# Patient Record
Sex: FEMALE | Race: WHITE | Employment: FULL TIME | URBAN - METROPOLITAN AREA
[De-identification: names, ages, dates, MRNs, and addresses within clinical notes are randomized per-mention and may not be internally consistent; named-entity substitution may affect disease eponyms.]

---

## 2021-10-05 ENCOUNTER — HOSPITAL ENCOUNTER (OUTPATIENT)
Dept: GENERAL RADIOLOGY | Age: 46
Discharge: HOME | End: 2021-10-05
Payer: COMMERCIAL

## 2021-10-05 ENCOUNTER — HOSPITAL ENCOUNTER (OUTPATIENT)
Age: 46
Discharge: HOME | End: 2021-10-05
Payer: COMMERCIAL

## 2021-10-05 ENCOUNTER — HOSPITAL ENCOUNTER (OUTPATIENT)
Dept: NEUROLOGY | Age: 46
Discharge: HOME | End: 2021-10-05
Payer: COMMERCIAL

## 2021-10-05 DIAGNOSIS — M54.12 CERVICAL RADICULOPATHY: ICD-10-CM

## 2021-10-05 DIAGNOSIS — M54.12 BRACHIAL NEURITIS: ICD-10-CM

## 2021-10-05 PROCEDURE — 95886 MUSC TEST DONE W/N TEST COMP: CPT

## 2021-10-05 PROCEDURE — 95908 NRV CNDJ TST 3-4 STUDIES: CPT

## 2021-10-05 PROCEDURE — 72040 X-RAY EXAM NECK SPINE 2-3 VW: CPT

## 2021-10-05 NOTE — PROCEDURES
Test Date:  10/5/2021    Patient: Christina Arguello : 1975 Physician: Tao Lakhani DO   Sex: Female ID#:  Ref Phys: Pierce العراقي  APRN-CNP     Patient Complaints:  Patient is a 51year-old female who presents with neck pain radiating to the Left upper extremity onset months ago with numbness in fingers and arms. Patient History / Exam:  PMH: no endocrine disease. + hep B no neck or arm surgery PE: reflexes trace,  mild thumb opposition weakness     NCV & EMG Findings:  Evaluation of the Left ulnar sensory nerve showed decreased conduction velocity (38 m/s). All remaining nerves (as indicated in the following tables) were within normal limits. All examined muscles (as indicated in the following table) showed no evidence of electrical instability.       Impression:  Essentially normal study without evidence of an acute radiculopathy, entrapment neuropathy or other lower motor neuron dysfunction Thank you     Tao Lakhani DO        Nerve Conduction Studies  Motor Nerve Results      Latency Amplitude F-Lat Segment Distance CV Comment   Site (ms) Norm (mV) Norm (ms)  (cm) (m/s) Norm    Left Median (APB) Motor   Wrist 3.6  < 4.2 7.0  > 5.0         Elbow 6.4 - 7.0 -  Elbow-Wrist 18 64  > 50    Left Ulnar (ADM) Motor   Wrist 3.0  < 4.2 7.5  > 3.0         Bel Elbow 5.8 - 6.5 -  Bel Elbow-Wrist 18 64  > 50    Abv Elbow 6.9 - 6.9 -  Abv Elbow-Bel Elbow 6 55  > 48      Sensory Nerve Results      Latency (Peak) Amplitude (P-P) Segment Distance CV Comment   Site (ms) Norm (µV) Norm  (cm) (m/s) Norm    Left Median Sensory   Wrist-Dig II 3.5  < 3.6 71  > 10 Wrist-Dig II 14 40  > 39    Left Ulnar Sensory   Wrist-Dig V 3.7  < 3.7 69  > 15 Wrist-Dig V 14 38  > 38        Electromyography     Side Muscle Nerve Root Ins Act Fibs Psw Amp Dur Poly Recrt Int Lovette Lennert Comment   Left Deltoid Axillary C5-C6 Nml Nml Nml Nml Nml 0 Nml Nml    Left Biceps Musculocut C5-C6 Nml Nml Nml Nml Nml 0 Nml Nml    Left Triceps Radial C6-C8 Nml Nml Nml Nml Nml 0 Nml Nml    Left Brachiorad Radial C5-C6 Nml Nml Nml Nml Nml 0 Nml Nml    Left Pronator Teres Median C6-C7 Nml Nml Nml Nml Nml 0 Nml Nml    Left EIP Post Interosseous,  R... C7-C8 Nml Nml Nml Nml Nml 0 Nml Nml    Left APB Median C8-T1 Nml Nml Nml Nml Nml 0 Nml Nml    Left FDI Ulnar C8-T1 Nml Nml Nml Nml Nml 0 Nml Nml    Left Cervical Paraspinal (Uppe. .. Rami C1-C3 Nml Nml Nml         Left Cervical Paraspinal (Mid) Rami C4-C6 Nml Nml Nml         Left Cervical Paraspinal (Lars Felty. ..  Rami C7-C8 Nml Nml Nml               Electronically signed by Maria Luisa Nicolas DO on 10/5/2021 at 10:14 AM